# Patient Record
(demographics unavailable — no encounter records)

---

## 2024-12-12 NOTE — PHYSICAL EXAM
[General Appearance - Well Developed] : well developed [Normal Appearance] : normal appearance [Well Groomed] : well groomed [General Appearance - Well Nourished] : well nourished [No Deformities] : no deformities [General Appearance - In No Acute Distress] : no acute distress [Normal Conjunctiva] : the conjunctiva exhibited no abnormalities [Eyelids - No Xanthelasma] : the eyelids demonstrated no xanthelasmas [Normal Oral Mucosa] : normal oral mucosa [No Oral Pallor] : no oral pallor [No Oral Cyanosis] : no oral cyanosis [Normal Jugular Venous A Waves Present] : normal jugular venous A waves present [Normal Jugular Venous V Waves Present] : normal jugular venous V waves present [No Jugular Venous Wang A Waves] : no jugular venous wang A waves [Respiration, Rhythm And Depth] : normal respiratory rhythm and effort [Exaggerated Use Of Accessory Muscles For Inspiration] : no accessory muscle use [Auscultation Breath Sounds / Voice Sounds] : lungs were clear to auscultation bilaterally [Heart Rate And Rhythm] : heart rate and rhythm were normal [Heart Sounds] : normal S1 and S2 [Murmurs] : no murmurs present [Abdomen Soft] : soft [Abdomen Tenderness] : non-tender [Abdomen Mass (___ Cm)] : no abdominal mass palpated [Abnormal Walk] : normal gait [Gait - Sufficient For Exercise Testing] : the gait was sufficient for exercise testing [Nail Clubbing] : no clubbing of the fingernails [Cyanosis, Localized] : no localized cyanosis [Petechial Hemorrhages (___cm)] : no petechial hemorrhages [Skin Color & Pigmentation] : normal skin color and pigmentation [] : no rash [No Venous Stasis] : no venous stasis [Skin Lesions] : no skin lesions [No Skin Ulcers] : no skin ulcer [No Xanthoma] : no  xanthoma was observed [Oriented To Time, Place, And Person] : oriented to person, place, and time [Affect] : the affect was normal [No Anxiety] : not feeling anxious [FreeTextEntry1] : Had been doing much better but obviously now seems depressed again and still misses his wife etc.

## 2024-12-12 NOTE — DISCUSSION/SUMMARY
[EKG obtained to assist in diagnosis and management of assessed problem(s)] : EKG obtained to assist in diagnosis and management of assessed problem(s) [FreeTextEntry1] : Urged patient to be compliant and more careful with his medications.  Labs sent as usual including thyroid functions for his amiodarone if he is taking it etc.  Will have him come back at the end of March with an echo as that will be 1 year plus a clinical visit.  Urged him to consider taking some kind of anticoagulant and of course he will think about it but not likely.

## 2024-12-12 NOTE — REVIEW OF SYSTEMS
[Joint Pain] : joint pain [Dizziness] : dizziness [Memory Lapses Or Loss] : memory lapses or loss [Depression] : depression [Negative] : Heme/Lymph [Weight Gain (___ Lbs)] : no recent weight gain [Feeling Fatigued] : not feeling fatigued [Weight Loss (___ Lbs)] : no recent weight loss [Chest Discomfort] : no chest discomfort [Lower Ext Edema] : no extremity edema [Palpitations] : no palpitations [Syncope] : no syncope [Suicidal] : not suicidal [FreeTextEntry8] : see HPI [FreeTextEntry9] : see HPI [de-identified] : old CVA on MRI [de-identified] : Somewhat depressed and somewhat still grieving

## 2024-12-12 NOTE — HISTORY OF PRESENT ILLNESS
[FreeTextEntry1] : 2020.  Patient difficult historian.  Did have atrial fibrillation about 3-1/2 years ago and pretty quickly had an ablation at [facility] by Dr. Johnson.  Unfortunately because of a confusion with his insurance and what was covered and what was not, he has been receiving large bills for the procedure that he cannot pay and has not followed up with Dr. Johnson.  He does see Dr. 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  He was put on Xarelto but then he stopped it on his own thinking that the risk of stroke was higher with the Xarelto.  In addition the cost of both Xarelto and Eliquis are too much for him.  The carvedilol has helped control his heart rate so that has not been a problem but for the last few weeks he "is feeling the atrial fibrillation".  Upon further history it sounds like he is having chest discomfort more with exertion but it can last all day.  Of interest he said he was feeling that discomfort while he was here having the EKG which does not show any ischemia.  He clearly does not feel like his usual baseline however.  He does have hypertension which is fairly well-controlled and he denies hyperlipidemia diabetes cigarette smoking or family history of coronary disease.  Both his parents were smokers and his father  of bladder cancer and his mother  of lung cancer.  He assumes he had an echocardiogram around the time of the ablation that was normal but is unaware of any stress test.  Never told of a murmur or rheumatic fever and never had syncope or near syncope. 2020.Patient's echo was somewhat suboptimal in terms of endocardium. It looked like there was a segmental wall motion abnormality with akinesis of the inferior wall and inferoseptum to the apex. Overall LVEF was around 50% there was mild to moderate MR, no AS or AI, a severely dilated left atrium, mild right atrial enlargement and normal RV size and function with RVSP 27. His pharmacologic stress test showed a very small area of ischemia in the inferoapical region and LVEF was 56% but seem to have normal wall motion. Based on these findings I do not see any criteria for coronary angiography. In terms of pharmacologic cardioversion of his atrial fibrillation, because of the possibility of mild underlying coronary disease I would avoid type IC drugs like flecainide but I might consider sotalol. In any case he needs to be on full anticoagulation for at least 4 weeks so he will be returning in 3 to 4 weeks and we will reevaluate and consider starting sotalol at that point. Meanwhile the office staff is looking into getting the NOAC's cheap; otherwise he is willing to take Coumadin.  2020.  First visit since May 20.  Remains in atrial fibrillation with a ventricular rate of 80. Having trouble with compliance with his Xarelto because of the cost and was actually taking it only every other day.  Long discussion with the patient about the problem with that.  Gave him enough samples for now and he agrees he will try to pay it through his wife's insurance and he will come back in 1 month's time and we will consider antiarrhythmic therapy cardioversion etc. 2020.  Patient returns here in follow-up along with his wife and is in rapid atrial fibrillation at 114 bpm.  No ischemic changes on the EKG. seems to have been compliant with all his meds although he does not take the Synthroid separately and on an empty stomach.  TSH went from 0.02 on  to 19.6 on . 2020.  Patient returns here with wife.  Now on his increased medical regimen his heart rate is controlled and he is tolerating the fibrillation without a problem.  Long discussion about the pros and cons of attempting to restore sinus rhythm whether medically, electrical cardioversion, ablation etc.  Discussed problem of certain medications only being able to start in the hospital.  Discussed long-term side effects of amiodarone.  Discussed Multaq but issue with the cost.  Discussed in detail with patient and wife the options of electrical cardioversion, pharmacologic cardioversion, they did not want to consider another ablation.  He was back on Xarelto at the time and they were supposed to get back to me as to whether to try Multaq etc. 2021.  It is almost a year since his last visit and he and his wife never came back to me to discuss any of their issues.  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  He claims the biggest problem was severe depression which is now responded to Prozac and he is doing much better.  Over the course of the year he has a new internist Dr. Reyes, he seems to have come down with hemochromatosis and is being treated by Dr. Raffaele Frost with blood donation every 3 weeks or so, and he had a work-up with Dr. Yuval Manning when he started having memory issues and was found to have an old CVA on MRI which she now understands was probably related to his atrial fibrillation.  He is on Xarelto although compliance has not been perfect and he is on carvedilol but he takes it only once a day and forgot his medication this morning which he claims is why his heart rate is fast now.  He denies exertional chest pain or shortness of breath. 2021.Reviewed labs with patient.  TSH suppressed and T4 upper limit of normal but he just started the Synthroid 0.1 after having been on 0.15.  Colonoscopy was canceled until after he comes back to the echo and follow-up.  Will remain on the Xarelto etc.   2021.  Patient returns in follow-up and for echocardiogram.  Extremely frustrating as patient has difficulty with his memory ever since his stroke and he unfortunately comes alone.  Complaining of dizziness but cannot pin down if this is a chronic problem or relatively recent and difficulty pointing out whether this is an orthostatic symptom or not.  Does not seem to be complaining of shortness of breath.  Just saw Dr. Frost and had labs the other day including iron studies that are pending.  Echocardiogram done today and he does seem to have normal LV function and segmental wall motion abnormalities are not seen although on the apical view the endocardium is not that well visualized.  He does have mild to moderate MR.  He has been totally compliant with his Xarelto.  He still is in atrial fibrillation and his ventricular rate is somewhat faster than it should be. December 15, 2021.  At the end of last visit I decided to load him with amiodarone slowly at 200 mg a day.  Meanwhile he saw Dr. Frost of heme-onc last week and the patient's blood pressure was initially 86/50 and then 101/56 so he held off doing a phlebotomy for his hemochromatosis.  His erythropoietin which had been as high as 1300 had gone down to 300.  Patient returns in follow-up today and is still in atrial fibrillation with a ventricular rate of 69.  Blood pressure is only 94/54.  Weight is down 3 more pounds.  Weak and dizzy and blood pressure may be a major issue.  Also some right lower quadrant and flank pain which may be kidney stones and he does have a fractured vertebra in the middle may be from his fall in the bathroom.  His internist lowered his amlodipine from 10 mg to 5 mg and I think I am going to stop it altogether.  He remains on carvedilol 6.25 every 12 hours and amiodarone and has been faithful with the Xarelto. 2021.Electrical cardioversion this morning at [facility].  200 J x 1 Successful with sinus rhythm at 55 with the usual right bundle branch block left anterior hemiblock and mild first-degree AV block.  Resume all medications.  Follow-up 2 weeks.   2022.  Patient is now here in follow-up.  He remains in sinus rhythm at 57 with a right bundle branch block, left anterior hemiblock, and short GA..  It does seem clear now that his pain was not from the kidney stones which have not moved but rather from his fractured vertebra.  Blood pressure is running a little high as he is now off the amlodipine and back in sinus rhythm. 2022.  Patient returns in follow-up.  Still in sinus rhythm, with heart rate 45, right bundle branch block, left anterior hemiblock.  GA seems to be within normal limits.  Still a difficult historian, sounds like he still has an occasional fall or 2.  So far no bradycardic symptoms as far as we can tell.  In.  (Unfortunately here alone, not with his wife etc.) 2022.  Patient returns in follow-up.  Unfortunately his wife passed away at [facility] a few weeks ago from renal failure and probable cardiac arrest.  Obviously still grieving.  He stopped the carvedilol because his wife had had a problem with it and he thought it was causing him to have dry skin etc.  His initial blood pressure was very high but he was also upset about his wife and it did come down fairly well although not perfect by the end of the exam.  He is still in sinus rhythm and his heart rate is 63 with his right bundle branch block and left anterior hemiblock.  Does not have first-degree AV block.  No real medical issues or complaints currently.  Still on Xarelto. 2022. Received a fax from his dentist Dr. Shayna Calixto about having him undergo procedure and holding his Eliquis for 2 days.  She has to replace an implant and he tends to be dental phobic and his pressure goes very high but she thinks she can manage him without sedating him and may be only holding the Eliquis the night before.  She says that the last time she saw him he was "all over the place" since his wife  and was fibrillating not in sinus.  The last few times I saw him he was in sinus but I have not seen him in 6 months.  She will make sure he comes to see me before we do any dental work.  Reviewed labs with patient.  I was concerned about the elevated LFTs that it could be from the amiodarone but then he admitted he has been drinking again, probably related to his wife passing away.  He agrees to stop the drinking again and go back to normal and repeat the blood test in 1 to 2 months.  We will continue with amiodarone for now.  2023.  First visit since last .  Is still in sinus rhythm at 55 with a left anterior hemiblock and mild IVCD and poor R wave progression.  Still somewhat depressed but doing better overall.  Happy with Dr. Reyes and she put him back on amlodipine and increased it to 10 mg; so far no edema.  He also has been taking his wife's carvedilol 6.25 twice a day.  He remains on amiodarone 200 a day and we reviewed all his medication.  His Synthroid was lowered to 0.088.  He is still in the process of doing dental implants.  He said Dr. Raffaele Frost told him he did not need to come back for his hemochromatosis for a year.  He also was diagnosed with osteoporosis. 2023.  Patient returns for his 4-month follow-up appointment.  He is still in sinus rhythm at 50 with poor R wave progression and mild IVCD.  Left anterior hemiblock.  No change.  He has lost another 2 pounds.  Still sees Dr. Reyes for primary care but it sounds like he may be seeing gastroenterologist to make sure he is not having any GI bleeding and I am not sure he is still sees Dr. Frost.  He still sounds somewhat depressed. October 10, 2023.  Patient returns in follow-up and for echocardiogram.  Has been compliant with his visits but admits to not being always so compliant with his medications, cost of Eliquis is still an issue and I gave him more samples.  Perhaps we will have to consider generic Pradaxa.  He has not followed up with hematology but he does follow-up with his internist which checks his iron levels etc.  He denies chest pain shortness of breath etc.  His echocardiogram today has progression of his mitral regurgitation, at least moderate but no change in LV or RV function.  He is still maintaining sinus rhythm at 66 with his right bundle branch block and left anterior hemiblock on amiodarone. April 10, 2024.  Patient returns for follow-up.  He remains in sinus bradycardia at 46 with a right bundle branch block, left anterior hemiblock, and borderline first-degree AV block.  He briefly went back to drinking and then had a bad fall so now was not drinking for almost 3 weeks.  He is glad that he stopped his blood thinner before he had the fall; he said he was anemic and maybe had some dark stools and when he stopped the blood thinner he is no longer anemic.  He did not see GI or have an endoscopy or colonoscopy.  He did have follow-up with urology and workup turned out to be negative without a recurrence of malignancy.  He is holding sinus rhythm on amiodarone.  On his own he went back to baby aspirin and I told him it does not help for the fibrillation and I do not think he has an indication otherwise on that it could lead to bleeding as well.  He remains on Prozac which he thinks is helping. 2024.  Patient returns for 6-month follow-up. It is clear today that he is back in the atrial fibrillation with a ventricular rate between 50 and 60.  Remains with a right bundle branch block and left anterior hemiblock. His blood pressure is borderline and his weight is unchanged. As in the past he is not really taking his amiodarone or his carvedilol and amlodipine and seems to only be taking his thyroid medicine.  On occasion he takes the other medications.  Has not really followed up with anybody about the hemochromatosis either.  He has seen urology and he did have an episode of a fall with a broken wrist.  Not really complaining but does not mind dying and obviously still grieving for his wife.  Later in the exam he seemed to have a regular rhythm so the rhythm strip was repeated; There seems to be a small P waves with short GA in front of the QRS that is different from his prior P waves so perhaps it is junctional or a low atrial rhythm. 2024.  Patient returns in follow-up.  Again he cannot be precise on whether he is taking his medications or not and for the most part it sounds like he is not in his EKG today is probably atrial fibrillation although possible again junctional or low atrial rhythm with APCs.  He is also not taking any anticoagulant even the dabigatran that I gave him prescription for.  No real medical or cardiac complaints and at least he keeps his appointments but still depressed and grieving somewhat.

## 2024-12-12 NOTE — ASSESSMENT
[FreeTextEntry1] : Maintaining sinus rhythm on his amiodarone. Labs will be checked. Continue Eliquis; admits to occasionally not being compliant mostly for cost issues. Samples given and might have to consider generic Pradaxa in the near future. Echo with normal LV and RV function and not complaining of shortness of breath despite some progression of his mitral regurgitation. proBNP will be checked. Continues to follow-up with his internist about his hemochromatosis which seems to be under control. Labs will be checked as well. Continue current medications. Had echo October 2023 with moderate to severe MR and severely enlarged LA but normal LV size and function With LVEF 67%. Returned April 10, 2024. Still somewhat depressed. Went back to drinking for a while and only stopped about 10 days ago. Off anticoagulants which is probably just as well, possibly had an interval GI bleed. Is holding sinus rhythm on the amiodarone and labs will be checked including thyroid function. Results will be forwarded to his internist and I will remind her about the necessary for pulmonary function tests with DLCO for pulmonary toxicity from amiodarone. Came Qctober 2, 2024. Patient never scheduled follow-up and echo as he was supposed to let alone pulmonary follow-up. Stated he ran out of amiodarone and stopped taking some of his other medications other than the thyroid for some reason. Still seems depressed and grieving for his wife. Does mention that he does not care if he would die but certainly not considering suicide etc. At first I thought he was back in atrial fibrillation but looks more junctional or low atrial although clearly different than his baseline. Labs were sent. In the meantime he said he would agree to going back on the amiodarone but it is not clear if he will take carvedilol or amlodipine as he says they are too expensive even though they are generic. I did mention to him about trying dabigatran because it is a generic DOAC and perhaps he can afford that although I have my doubts he will do this. He did agree to schedule a return in 2 months possibly with an echo.   Patient returns today December 12, 2024.  No echo scheduled with the visit.  Willing to keep his appointments but he admits he has not been very compliant with the medication coming up with a number of excuses etc.  Blood pressure also higher than usual today but he had a lot of traffic getting here and got here late etc.  Not taking his dab ejaculate I gave a prescription for or any blood thinner.  Again not clear if he is in atrial fibrillation or has low atrial or junctional rhythm with occasional APCs which is probably more likely still.  He did officially go back on amiodarone but admits to being erratic about when he takes it as well.  Another long talk about taking care of himself etc.

## 2024-12-12 NOTE — REASON FOR VISIT
[FreeTextEntry1] : 78-year-old man with atrial fibrillation and hemochromatosis, now s/p cardioversion end of 2021 but now back in atrial fibrillation.
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
